# Patient Record
Sex: FEMALE | Race: WHITE
[De-identification: names, ages, dates, MRNs, and addresses within clinical notes are randomized per-mention and may not be internally consistent; named-entity substitution may affect disease eponyms.]

---

## 2019-01-04 ENCOUNTER — HOSPITAL ENCOUNTER (OUTPATIENT)
Dept: HOSPITAL 90 - DAH | Age: 74
Discharge: HOME | End: 2019-01-04
Attending: INTERNAL MEDICINE
Payer: MEDICARE

## 2019-01-04 VITALS — SYSTOLIC BLOOD PRESSURE: 131 MMHG | DIASTOLIC BLOOD PRESSURE: 70 MMHG

## 2019-01-04 VITALS — SYSTOLIC BLOOD PRESSURE: 144 MMHG | DIASTOLIC BLOOD PRESSURE: 78 MMHG

## 2019-01-04 VITALS — DIASTOLIC BLOOD PRESSURE: 74 MMHG | SYSTOLIC BLOOD PRESSURE: 136 MMHG

## 2019-01-04 VITALS — SYSTOLIC BLOOD PRESSURE: 138 MMHG | DIASTOLIC BLOOD PRESSURE: 77 MMHG

## 2019-01-04 VITALS — BODY MASS INDEX: 34.95 KG/M2 | HEIGHT: 60 IN | WEIGHT: 178 LBS

## 2019-01-04 VITALS — DIASTOLIC BLOOD PRESSURE: 69 MMHG | SYSTOLIC BLOOD PRESSURE: 147 MMHG

## 2019-01-04 VITALS — SYSTOLIC BLOOD PRESSURE: 141 MMHG | DIASTOLIC BLOOD PRESSURE: 70 MMHG

## 2019-01-04 DIAGNOSIS — Z79.899: ICD-10-CM

## 2019-01-04 DIAGNOSIS — K44.9: ICD-10-CM

## 2019-01-04 DIAGNOSIS — Z98.890: ICD-10-CM

## 2019-01-04 DIAGNOSIS — K31.7: ICD-10-CM

## 2019-01-04 DIAGNOSIS — Z98.49: ICD-10-CM

## 2019-01-04 DIAGNOSIS — K31.89: ICD-10-CM

## 2019-01-04 DIAGNOSIS — K21.9: ICD-10-CM

## 2019-01-04 DIAGNOSIS — Z88.8: ICD-10-CM

## 2019-01-04 DIAGNOSIS — K31.819: ICD-10-CM

## 2019-01-04 DIAGNOSIS — K22.8: ICD-10-CM

## 2019-01-04 DIAGNOSIS — K29.50: Primary | ICD-10-CM

## 2019-01-04 DIAGNOSIS — Z98.51: ICD-10-CM

## 2019-01-04 PROCEDURE — 88305 TISSUE EXAM BY PATHOLOGIST: CPT

## 2019-01-04 PROCEDURE — 43239 EGD BIOPSY SINGLE/MULTIPLE: CPT

## 2019-06-24 ENCOUNTER — HOSPITAL ENCOUNTER (OUTPATIENT)
Dept: HOSPITAL 90 - RAH | Age: 74
Discharge: HOME | End: 2019-06-24
Attending: INTERNAL MEDICINE
Payer: MEDICARE

## 2019-06-24 DIAGNOSIS — Z12.31: Primary | ICD-10-CM

## 2019-06-24 PROCEDURE — 77067 SCR MAMMO BI INCL CAD: CPT

## 2019-11-12 ENCOUNTER — HOSPITAL ENCOUNTER (OUTPATIENT)
Dept: HOSPITAL 90 - RAH | Age: 74
Discharge: HOME | End: 2019-11-12
Attending: INTERNAL MEDICINE
Payer: MEDICARE

## 2019-11-12 DIAGNOSIS — I48.91: ICD-10-CM

## 2019-11-12 DIAGNOSIS — R06.09: Primary | ICD-10-CM

## 2019-11-12 DIAGNOSIS — I45.10: ICD-10-CM

## 2019-11-12 DIAGNOSIS — R06.02: ICD-10-CM

## 2019-11-12 DIAGNOSIS — R23.2: ICD-10-CM

## 2019-11-12 PROCEDURE — 96374 THER/PROPH/DIAG INJ IV PUSH: CPT

## 2019-11-12 PROCEDURE — 93017 CV STRESS TEST TRACING ONLY: CPT

## 2019-11-12 PROCEDURE — 78452 HT MUSCLE IMAGE SPECT MULT: CPT

## 2020-01-10 VITALS — SYSTOLIC BLOOD PRESSURE: 139 MMHG | DIASTOLIC BLOOD PRESSURE: 67 MMHG

## 2020-01-10 LAB
APPEARANCE UR: CLEAR
APTT PPP: 25 SEC (ref 26.3–35.5)
BASOPHILS NFR BLD AUTO: 0.7 % (ref 0–5)
BILIRUB UR QL STRIP: NEGATIVE
BUN SERPL-MCNC: 19 MG/DL (ref 7–18)
CHLORIDE SERPL-SCNC: 105 MMOL/L (ref 101–111)
CO2 SERPL-SCNC: 32 MMOL/L (ref 21–32)
COLOR UR: YELLOW
CREAT SERPL-MCNC: 0.8 MG/DL (ref 0.5–1.5)
DEPRECATED SQUAMOUS URNS QL MICRO: (no result) /HPF (ref 0–2)
EOSINOPHIL NFR BLD AUTO: 4.3 % (ref 0–8)
ERYTHROCYTE [DISTWIDTH] IN BLOOD BY AUTOMATED COUNT: 12.5 % (ref 11–15.5)
GFR SERPL CREATININE-BSD FRML MDRD: 74 ML/MIN (ref 60–?)
GLUCOSE SERPL-MCNC: 106 MG/DL (ref 70–105)
GLUCOSE UR STRIP-MCNC: NEGATIVE MG/DL
HCT VFR BLD AUTO: 41.3 % (ref 36–48)
HGB UR QL STRIP: NEGATIVE
INR PPP: 0.95 (ref 0.85–1.15)
KETONES UR STRIP-MCNC: NEGATIVE MG/DL
LEUKOCYTE ESTERASE UR QL STRIP: (no result)
LYMPHOCYTES NFR SPEC AUTO: 20 % (ref 21–51)
MCH RBC QN AUTO: 30.8 PG (ref 27–33)
MCHC RBC AUTO-ENTMCNC: 32.4 G/DL (ref 32–36)
MCV RBC AUTO: 94.9 FL (ref 79–99)
MONOCYTES NFR BLD AUTO: 14 % (ref 3–13)
NEUTROPHILS NFR BLD AUTO: 60.7 % (ref 40–77)
NITRITE UR QL STRIP: NEGATIVE
NRBC BLD MANUAL-RTO: 0 % (ref 0–0.19)
PH UR STRIP: 6.5 [PH] (ref 5–8)
PLATELET # BLD AUTO: 273 K/UL (ref 130–400)
POTASSIUM SERPL-SCNC: 4.7 MMOL/L (ref 3.5–5.1)
PROT UR QL STRIP: NEGATIVE MG/DL
PROTHROMBIN TIME: 10 SEC (ref 9.6–11.6)
RBC # BLD AUTO: 4.35 MIL/UL (ref 4–5.5)
RBC #/AREA URNS HPF: (no result) /HPF (ref 0–1)
SODIUM SERPL-SCNC: 144 MMOL/L (ref 136–145)
SP GR UR STRIP: 1.02 (ref 1–1.03)
UROBILINOGEN UR STRIP-MCNC: 0.2 MG/DL (ref 0.2–1)
WBC # BLD AUTO: 6.8 K/UL (ref 4.8–10.8)
WBC #/AREA URNS HPF: (no result) /HPF (ref 0–1)

## 2020-01-14 ENCOUNTER — HOSPITAL ENCOUNTER (OUTPATIENT)
Dept: HOSPITAL 90 - DAH | Age: 75
Discharge: HOME | End: 2020-01-14
Attending: INTERNAL MEDICINE
Payer: MEDICARE

## 2020-01-14 VITALS — DIASTOLIC BLOOD PRESSURE: 68 MMHG | SYSTOLIC BLOOD PRESSURE: 122 MMHG

## 2020-01-14 VITALS — SYSTOLIC BLOOD PRESSURE: 115 MMHG | DIASTOLIC BLOOD PRESSURE: 70 MMHG

## 2020-01-14 VITALS — DIASTOLIC BLOOD PRESSURE: 68 MMHG | SYSTOLIC BLOOD PRESSURE: 132 MMHG

## 2020-01-14 VITALS — DIASTOLIC BLOOD PRESSURE: 71 MMHG | SYSTOLIC BLOOD PRESSURE: 135 MMHG

## 2020-01-14 VITALS — DIASTOLIC BLOOD PRESSURE: 75 MMHG | SYSTOLIC BLOOD PRESSURE: 134 MMHG

## 2020-01-14 VITALS — DIASTOLIC BLOOD PRESSURE: 77 MMHG | SYSTOLIC BLOOD PRESSURE: 137 MMHG

## 2020-01-14 VITALS — BODY MASS INDEX: 33.99 KG/M2 | WEIGHT: 180 LBS | HEIGHT: 61 IN

## 2020-01-14 VITALS — SYSTOLIC BLOOD PRESSURE: 111 MMHG | DIASTOLIC BLOOD PRESSURE: 59 MMHG

## 2020-01-14 VITALS — DIASTOLIC BLOOD PRESSURE: 76 MMHG | SYSTOLIC BLOOD PRESSURE: 151 MMHG

## 2020-01-14 VITALS — DIASTOLIC BLOOD PRESSURE: 60 MMHG | SYSTOLIC BLOOD PRESSURE: 116 MMHG

## 2020-01-14 VITALS — DIASTOLIC BLOOD PRESSURE: 91 MMHG | SYSTOLIC BLOOD PRESSURE: 135 MMHG

## 2020-01-14 VITALS — DIASTOLIC BLOOD PRESSURE: 71 MMHG | SYSTOLIC BLOOD PRESSURE: 143 MMHG

## 2020-01-14 DIAGNOSIS — I25.10: Primary | ICD-10-CM

## 2020-01-14 DIAGNOSIS — Z91.040: ICD-10-CM

## 2020-01-14 DIAGNOSIS — Z82.5: ICD-10-CM

## 2020-01-14 DIAGNOSIS — Z88.8: ICD-10-CM

## 2020-01-14 DIAGNOSIS — Z88.5: ICD-10-CM

## 2020-01-14 DIAGNOSIS — Z82.49: ICD-10-CM

## 2020-01-14 DIAGNOSIS — Z79.01: ICD-10-CM

## 2020-01-14 DIAGNOSIS — Z98.890: ICD-10-CM

## 2020-01-14 DIAGNOSIS — Z86.010: ICD-10-CM

## 2020-01-14 DIAGNOSIS — Z98.51: ICD-10-CM

## 2020-01-14 DIAGNOSIS — Z82.3: ICD-10-CM

## 2020-01-14 DIAGNOSIS — Z87.891: ICD-10-CM

## 2020-01-14 DIAGNOSIS — Z91.041: ICD-10-CM

## 2020-01-14 DIAGNOSIS — K21.9: ICD-10-CM

## 2020-01-14 DIAGNOSIS — Z79.899: ICD-10-CM

## 2020-01-14 PROCEDURE — 81001 URINALYSIS AUTO W/SCOPE: CPT

## 2020-01-14 PROCEDURE — 71045 X-RAY EXAM CHEST 1 VIEW: CPT

## 2020-01-14 PROCEDURE — 85610 PROTHROMBIN TIME: CPT

## 2020-01-14 PROCEDURE — 36415 COLL VENOUS BLD VENIPUNCTURE: CPT

## 2020-01-14 PROCEDURE — 99157 MOD SED OTHER PHYS/QHP EA: CPT

## 2020-01-14 PROCEDURE — 93005 ELECTROCARDIOGRAM TRACING: CPT

## 2020-01-14 PROCEDURE — 85730 THROMBOPLASTIN TIME PARTIAL: CPT

## 2020-01-14 PROCEDURE — 93458 L HRT ARTERY/VENTRICLE ANGIO: CPT

## 2020-01-14 PROCEDURE — 99156 MOD SED OTH PHYS/QHP 5/>YRS: CPT

## 2020-01-14 PROCEDURE — 85025 COMPLETE CBC W/AUTO DIFF WBC: CPT

## 2020-01-14 PROCEDURE — 80048 BASIC METABOLIC PNL TOTAL CA: CPT

## 2020-01-14 NOTE — NUR
PT awaiting procedure, awake, alert, and oriented, shows no signs of distress, denies any 
pain.  No needs, questions or concerns expressed at this time.

## 2020-01-14 NOTE — NUR
Inserted 16fr inman as per order due to patient request, sterile technique , no 
complications, pt tolerated well.

## 2020-01-14 NOTE — NUR
PT discharged home, tolerating fluids/solids well, ambulating well, balloon deflated and 
Brewer removed prior to discharge and pt voided before leaving. PT denies any severe, pain, 
nausea or dizziness.  Dressing to right groin remains dry, clean, and intact, site remains 
soft s any indications of bleeding. Pt and spouse instructed in routine and emergency care 
of right femoral cath site.  Pt and spouse verbalize understanding. Pt and spouse report no 
further questions at this time.

## 2020-04-29 ENCOUNTER — HOSPITAL ENCOUNTER (EMERGENCY)
Dept: HOSPITAL 90 - EDH | Age: 75
Discharge: HOME | End: 2020-04-29
Payer: MEDICARE
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
## 2020-04-29 DIAGNOSIS — S00.93XA: ICD-10-CM

## 2020-04-29 DIAGNOSIS — Z88.2: ICD-10-CM

## 2020-04-29 DIAGNOSIS — Z88.8: ICD-10-CM

## 2020-04-29 DIAGNOSIS — Z91.041: ICD-10-CM
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
## 2020-04-29 DIAGNOSIS — I10: ICD-10-CM
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
## 2020-04-29 DIAGNOSIS — Z91.040: ICD-10-CM

## 2020-04-29 DIAGNOSIS — S00.81XA: Primary | ICD-10-CM

## 2020-04-29 PROCEDURE — 29125 APPL SHORT ARM SPLINT STATIC: CPT

## 2020-04-29 PROCEDURE — 70450 CT HEAD/BRAIN W/O DYE: CPT

## 2020-04-29 PROCEDURE — 73110 X-RAY EXAM OF WRIST: CPT

## 2020-09-09 ENCOUNTER — HOSPITAL ENCOUNTER (OUTPATIENT)
Dept: HOSPITAL 90 - RAH | Age: 75
Discharge: HOME | End: 2020-09-09
Attending: INTERNAL MEDICINE
Payer: MEDICARE

## 2020-09-09 DIAGNOSIS — M54.12: ICD-10-CM

## 2020-09-09 DIAGNOSIS — M48.02: Primary | ICD-10-CM

## 2020-09-09 DIAGNOSIS — M25.78: ICD-10-CM

## 2020-09-09 PROCEDURE — 72141 MRI NECK SPINE W/O DYE: CPT
